# Patient Record
Sex: FEMALE | URBAN - METROPOLITAN AREA
[De-identification: names, ages, dates, MRNs, and addresses within clinical notes are randomized per-mention and may not be internally consistent; named-entity substitution may affect disease eponyms.]

---

## 2019-03-17 ENCOUNTER — EMERGENCY (EMERGENCY)
Facility: HOSPITAL | Age: 49
LOS: 1 days | Discharge: LEFT BEFORE DISCHARGE | End: 2019-03-17
Attending: EMERGENCY MEDICINE
Payer: COMMERCIAL

## 2019-03-17 VITALS
TEMPERATURE: 98 F | WEIGHT: 160.06 LBS | HEART RATE: 97 BPM | OXYGEN SATURATION: 100 % | HEIGHT: 68 IN | SYSTOLIC BLOOD PRESSURE: 113 MMHG | RESPIRATION RATE: 18 BRPM | DIASTOLIC BLOOD PRESSURE: 74 MMHG

## 2019-03-17 VITALS
DIASTOLIC BLOOD PRESSURE: 73 MMHG | HEART RATE: 82 BPM | OXYGEN SATURATION: 98 % | TEMPERATURE: 98 F | RESPIRATION RATE: 18 BRPM | SYSTOLIC BLOOD PRESSURE: 106 MMHG

## 2019-03-17 LAB
ALBUMIN SERPL ELPH-MCNC: 4.4 G/DL — SIGNIFICANT CHANGE UP (ref 3.3–5.2)
ALP SERPL-CCNC: 187 U/L — HIGH (ref 40–120)
ALT FLD-CCNC: 120 U/L — HIGH
ANION GAP SERPL CALC-SCNC: 12 MMOL/L — SIGNIFICANT CHANGE UP (ref 5–17)
APPEARANCE UR: CLEAR — SIGNIFICANT CHANGE UP
AST SERPL-CCNC: 87 U/L — HIGH
BACTERIA # UR AUTO: ABNORMAL
BASOPHILS # BLD AUTO: 0 K/UL — SIGNIFICANT CHANGE UP (ref 0–0.2)
BASOPHILS NFR BLD AUTO: 0.3 % — SIGNIFICANT CHANGE UP (ref 0–2)
BILIRUB SERPL-MCNC: 0.4 MG/DL — SIGNIFICANT CHANGE UP (ref 0.4–2)
BILIRUB UR-MCNC: NEGATIVE — SIGNIFICANT CHANGE UP
BUN SERPL-MCNC: 11 MG/DL — SIGNIFICANT CHANGE UP (ref 8–20)
CALCIUM SERPL-MCNC: 9.6 MG/DL — SIGNIFICANT CHANGE UP (ref 8.6–10.2)
CHLORIDE SERPL-SCNC: 102 MMOL/L — SIGNIFICANT CHANGE UP (ref 98–107)
CO2 SERPL-SCNC: 22 MMOL/L — SIGNIFICANT CHANGE UP (ref 22–29)
COLOR SPEC: YELLOW — SIGNIFICANT CHANGE UP
CREAT SERPL-MCNC: 0.65 MG/DL — SIGNIFICANT CHANGE UP (ref 0.5–1.3)
DIFF PNL FLD: ABNORMAL
EOSINOPHIL # BLD AUTO: 0.1 K/UL — SIGNIFICANT CHANGE UP (ref 0–0.5)
EOSINOPHIL NFR BLD AUTO: 2 % — SIGNIFICANT CHANGE UP (ref 0–6)
EPI CELLS # UR: SIGNIFICANT CHANGE UP
GLUCOSE SERPL-MCNC: 97 MG/DL — SIGNIFICANT CHANGE UP (ref 70–115)
GLUCOSE UR QL: NEGATIVE MG/DL — SIGNIFICANT CHANGE UP
HCG SERPL-ACNC: <4 MIU/ML — SIGNIFICANT CHANGE UP
HCT VFR BLD CALC: 38.3 % — SIGNIFICANT CHANGE UP (ref 37–47)
HGB BLD-MCNC: 12.1 G/DL — SIGNIFICANT CHANGE UP (ref 12–16)
KETONES UR-MCNC: NEGATIVE — SIGNIFICANT CHANGE UP
LEUKOCYTE ESTERASE UR-ACNC: ABNORMAL
LYMPHOCYTES # BLD AUTO: 1.2 K/UL — SIGNIFICANT CHANGE UP (ref 1–4.8)
LYMPHOCYTES # BLD AUTO: 20.2 % — SIGNIFICANT CHANGE UP (ref 20–55)
MCHC RBC-ENTMCNC: 26 PG — LOW (ref 27–31)
MCHC RBC-ENTMCNC: 31.6 G/DL — LOW (ref 32–36)
MCV RBC AUTO: 82.4 FL — SIGNIFICANT CHANGE UP (ref 81–99)
MONOCYTES # BLD AUTO: 0.5 K/UL — SIGNIFICANT CHANGE UP (ref 0–0.8)
MONOCYTES NFR BLD AUTO: 7.7 % — SIGNIFICANT CHANGE UP (ref 3–10)
NEUTROPHILS # BLD AUTO: 4.2 K/UL — SIGNIFICANT CHANGE UP (ref 1.8–8)
NEUTROPHILS NFR BLD AUTO: 69.8 % — SIGNIFICANT CHANGE UP (ref 37–73)
NITRITE UR-MCNC: NEGATIVE — SIGNIFICANT CHANGE UP
PH UR: 6.5 — SIGNIFICANT CHANGE UP (ref 5–8)
PLATELET # BLD AUTO: 242 K/UL — SIGNIFICANT CHANGE UP (ref 150–400)
POTASSIUM SERPL-MCNC: 5.8 MMOL/L — HIGH (ref 3.5–5.3)
POTASSIUM SERPL-SCNC: 5.8 MMOL/L — HIGH (ref 3.5–5.3)
PROT SERPL-MCNC: 7.6 G/DL — SIGNIFICANT CHANGE UP (ref 6.6–8.7)
PROT UR-MCNC: NEGATIVE MG/DL — SIGNIFICANT CHANGE UP
RBC # BLD: 4.65 M/UL — SIGNIFICANT CHANGE UP (ref 4.4–5.2)
RBC # FLD: 14.4 % — SIGNIFICANT CHANGE UP (ref 11–15.6)
RBC CASTS # UR COMP ASSIST: ABNORMAL /HPF (ref 0–4)
SODIUM SERPL-SCNC: 136 MMOL/L — SIGNIFICANT CHANGE UP (ref 135–145)
SP GR SPEC: 1.01 — SIGNIFICANT CHANGE UP (ref 1.01–1.02)
UROBILINOGEN FLD QL: NEGATIVE MG/DL — SIGNIFICANT CHANGE UP
WBC # BLD: 6.1 K/UL — SIGNIFICANT CHANGE UP (ref 4.8–10.8)
WBC # FLD AUTO: 6.1 K/UL — SIGNIFICANT CHANGE UP (ref 4.8–10.8)
WBC UR QL: SIGNIFICANT CHANGE UP

## 2019-03-17 PROCEDURE — 93005 ELECTROCARDIOGRAM TRACING: CPT

## 2019-03-17 PROCEDURE — 99284 EMERGENCY DEPT VISIT MOD MDM: CPT | Mod: 25

## 2019-03-17 PROCEDURE — 96374 THER/PROPH/DIAG INJ IV PUSH: CPT

## 2019-03-17 PROCEDURE — 36415 COLL VENOUS BLD VENIPUNCTURE: CPT

## 2019-03-17 PROCEDURE — 84702 CHORIONIC GONADOTROPIN TEST: CPT

## 2019-03-17 PROCEDURE — 93010 ELECTROCARDIOGRAM REPORT: CPT

## 2019-03-17 PROCEDURE — 82962 GLUCOSE BLOOD TEST: CPT

## 2019-03-17 PROCEDURE — 85027 COMPLETE CBC AUTOMATED: CPT

## 2019-03-17 PROCEDURE — 70450 CT HEAD/BRAIN W/O DYE: CPT

## 2019-03-17 PROCEDURE — 99284 EMERGENCY DEPT VISIT MOD MDM: CPT

## 2019-03-17 PROCEDURE — 81001 URINALYSIS AUTO W/SCOPE: CPT

## 2019-03-17 PROCEDURE — 70450 CT HEAD/BRAIN W/O DYE: CPT | Mod: 26

## 2019-03-17 PROCEDURE — 80053 COMPREHEN METABOLIC PANEL: CPT

## 2019-03-17 RX ORDER — ALPRAZOLAM 0.25 MG
1 TABLET ORAL
Qty: 0 | Refills: 0 | COMMUNITY

## 2019-03-17 RX ORDER — LEVETIRACETAM 250 MG/1
500 TABLET, FILM COATED ORAL
Qty: 0 | Refills: 0 | Status: DISCONTINUED | OUTPATIENT
Start: 2019-03-17 | End: 2019-03-22

## 2019-03-17 RX ORDER — LEVETIRACETAM 250 MG/1
1000 TABLET, FILM COATED ORAL ONCE
Qty: 0 | Refills: 0 | Status: COMPLETED | OUTPATIENT
Start: 2019-03-17 | End: 2019-03-17

## 2019-03-17 RX ORDER — SODIUM CHLORIDE 9 MG/ML
1000 INJECTION INTRAMUSCULAR; INTRAVENOUS; SUBCUTANEOUS ONCE
Qty: 0 | Refills: 0 | Status: COMPLETED | OUTPATIENT
Start: 2019-03-17 | End: 2019-03-17

## 2019-03-17 RX ORDER — SODIUM CHLORIDE 9 MG/ML
3 INJECTION INTRAMUSCULAR; INTRAVENOUS; SUBCUTANEOUS ONCE
Qty: 0 | Refills: 0 | Status: COMPLETED | OUTPATIENT
Start: 2019-03-17 | End: 2019-03-17

## 2019-03-17 RX ADMIN — SODIUM CHLORIDE 1000 MILLILITER(S): 9 INJECTION INTRAMUSCULAR; INTRAVENOUS; SUBCUTANEOUS at 15:14

## 2019-03-17 RX ADMIN — LEVETIRACETAM 400 MILLIGRAM(S): 250 TABLET, FILM COATED ORAL at 15:00

## 2019-03-17 RX ADMIN — LEVETIRACETAM 1000 MILLIGRAM(S): 250 TABLET, FILM COATED ORAL at 15:15

## 2019-03-17 RX ADMIN — SODIUM CHLORIDE 3 MILLILITER(S): 9 INJECTION INTRAMUSCULAR; INTRAVENOUS; SUBCUTANEOUS at 15:00

## 2019-03-17 NOTE — ED PROVIDER NOTE - CLINICAL SUMMARY MEDICAL DECISION MAKING FREE TEXT BOX
49 yo F hx of seizure now post-ictal with witnessed seizure by EMS, received versed 5 mg IM, 49 yo F hx of seizure now post-ictal with witnessed seizure by EMS, received versed 5 mg IM, not responding to painful stimuli, priority head CT done. will check blood work, UA, neurology consult.

## 2019-03-17 NOTE — ED PROVIDER NOTE - PMH
Herniated lumbar intervertebral disc    Interstitial cystitis    Pulmonary nodules    Spinal stenosis    ST segment depression

## 2019-03-17 NOTE — ED PROVIDER NOTE - PHYSICAL EXAMINATION
VITAL SIGNS: I have reviewed nursing notes and confirm.  CONSTITUTIONAL: Well-developed; well-nourished; (+) non-verbal  SKIN: Skin exam is warm and dry, no acute rash.  HEAD: Normocephalic; atraumatic.  EYES: PERRL, EOM unable to test conjunctiva and sclera clear.  ENT: No nasal discharge; airway clear. Throat clear.  NECK: Supple; non tender.  No lymphadenopathy.  CARD: S1, S2 normal; no murmurs, gallops, or rubs. Regular rate and rhythm.  RESP: No wheezes,  no rales or rhonchi.   ABD: Normal bowel sounds; soft; non-distended; non-tender; no hepatosplenomegaly.  EXT: Normal ROM. No clubbing, cyanosis or edema.  NEURO: (+) eyes open, occasional blinking, look at the left side but able to move the head without difficulty, no gaze deviation, Does NOT withdrawl to painful stimuli, b/l feet pointed

## 2019-03-17 NOTE — ED PROVIDER NOTE - OBJECTIVE STATEMENT
47 yo F hx of seizure/ pseudoseizure on ativan (unsure of medication compliance) p/w seizure and post-ictal state. EMS report that patient was found unconscious, post-ictal in bed, looking to the left, not moving. Patient never returned to normal baseline. She is normaly OA x 3. While in the ambulance, EMS report clonic tonic seizure lasting 30 second, she received 5mg versed IM. Currently, patient non-verbal, occasionally blinks the eye, neck turned to left but no gaze deviation. FS 70. priority CT called.

## 2019-03-17 NOTE — ED ADULT NURSE NOTE - CAS ELECT INFOMATION PROVIDED
DC instructions/patient given d/c instructions and instructed to follow up with pmd and return if worse

## 2019-03-17 NOTE — ED ADULT TRIAGE NOTE - CHIEF COMPLAINT QUOTE
Pt alert, no response to any stimuli, pt non verbal. as per EMS pt was last seen normal last night, pt was seen this morning to have seizure by EMS 5,g midazolam. Dr. Godfrey muñiz to evaluate.

## 2019-03-17 NOTE — ED ADULT NURSE REASSESSMENT NOTE - NS ED NURSE REASSESS COMMENT FT1
patient rec'd at this time p[t alert amd cooperative. sl to right inner wrist intact and flsues well patient reqwuesting to go home

## 2019-03-17 NOTE — CONSULT NOTE ADULT - ASSESSMENT
Impression:  Seizure disorder/nonepileptiform seizures    Plan:  keppra 500 mg BID  EEG  MRI of brain with and without NANDO to assess for mass  Consider video EEG monitoring off keppra  seizure precautions

## 2019-03-17 NOTE — PHARMACOTHERAPY INTERVENTION NOTE - COMMENTS
Patient post-ictal; unconscious. Referenced pharmacy records to identify if she takes any medications daily. Verified alprazolam (2 mg bid) and oxycodone/APAP (1 tab q6h prn) were filled 03/03 for 30 day supply

## 2019-03-17 NOTE — ED ADULT NURSE NOTE - OBJECTIVE STATEMENT
Pt A&OX3, states she had a seizure.  Pt was found unresponsive on bed, in her room by family.  Pt appears to be lethargic.  Pt denies any head injury.  Pt states she has been told she has pseudoseizures.

## 2019-03-17 NOTE — CONSULT NOTE ADULT - SUBJECTIVE AND OBJECTIVE BOX
HPI:  Patient is a 48 year old female who presents for seizures.  She has history of seziures/psuedo seizures.   She is on no know anti seziure medication.  She reprots family found her after a seizure and she had another seizure at ER lasting 30 seconds which was GTC.     PAST MEDICAL & SURGICAL HISTORY:  ST segment depression  Herniated lumbar intervertebral disc  Spinal stenosis  Interstitial cystitis  Pulmonary nodules  Delivered by  section: X 3      MEDICATIONS  (STANDING):    MEDICATIONS  (PRN):      Allergies    penicillin (Rash)    Intolerances        SOCIAL HISTORY:    FAMILY HISTORY:  Family history of seizure in son (Child): Autistic child  Family history of autism (Child)      Vital Signs Last 24 Hrs  T(C): 36.5 (17 Mar 2019 12:50), Max: 36.5 (17 Mar 2019 12:50)  T(F): 97.7 (17 Mar 2019 12:50), Max: 97.7 (17 Mar 2019 12:50)  HR: 97 (17 Mar 2019 12:50) (97 - 97)  BP: 113/74 (17 Mar 2019 12:50) (113/74 - 113/74)  BP(mean): --  RR: 18 (17 Mar 2019 12:50) (18 - 18)  SpO2: 100% (17 Mar 2019 12:50) (100% - 100%)    Neurological Exam:  Patient is minimally drowsy, but oriented .  There is no aphasia, there is mild  dysarthria . Follows complex commands. Vision is intact to confrontation.   Pupils are equal and reactive. Extra occular  muscles are intact. There is no facial droop or asymmetry. Tongue is midline.   Sensation is intact in the face. Other CN II-XII are intact.   On motor examination all muscles are 5/5 and there is no pronator drift. Sensory is intact to pinprick and touch. DTR are 2/4 all 4 extremities. Babinski is negative bilateral.     LABS:                        12.1   6.1   )-----------( 242      ( 17 Mar 2019 15:15 )             38.3     03-    136  |  102  |  11.0  ----------------------------<  97  5.8<H>   |  22.0  |  0.65    Ca    9.6      17 Mar 2019 15:15    TPro  7.6  /  Alb  4.4  /  TBili  0.4  /  DBili  x   /  AST  87<H>  /  ALT  120<H>  /  AlkPhos  187<H>            RADIOLOGY & ADDITIONAL STUDIES:   EXAM:  CT BRAIN                          PROCEDURE DATE:  2019          INTERPRETATION:  Comparison exam dated 2015    Clinical information: Seizure    Axial images obtained, coronal and sagittal images computer reformatted.    The ventricles, basal cisterns, and subarachnoid sulci are normal in   appearance for patient of this age group. No evidence of mass effect,   midline shift, epidural, or subdural collection. No sign of acute or   recent hemorrhage. No unusual calcificationspresent. Calvarium intact.   Mastoids pneumatized. Diffuse mucosal thickening left maxillary sinus.   Mucosal thickening right ethmoid sinus.    IMPRESSION:    No acute intracranial pathology. Follow-up with MRI if clinically   appropriate.

## 2019-03-17 NOTE — ED PROVIDER NOTE - PROGRESS NOTE DETAILS
patient seen by neurology Patient now AO x 3 but slow speech. patient seen by neurology, recommend EEG and MRI. Patient report that she had multiple EEG done and didn't show epileptic seizure and that seizure medication make her feel worse. Patient want to leave against medical advise and follow up with her neurologist. Pt understands and recalls risks of leaving against medical advice, including death. Pt states that pt will see her neurologist. Pt advised to return to the ED if pt feels worse. She refused prescription for Keppra.

## 2019-03-17 NOTE — ED ADULT NURSE NOTE - NSIMPLEMENTINTERV_GEN_ALL_ED
Implemented All Universal Safety Interventions:  [place] to call system. Call bell, personal items and telephone within reach. Instruct patient to call for assistance. Room bathroom lighting operational. Non-slip footwear when patient is off stretcher. Physically safe environment: no spills, clutter or unnecessary equipment. Stretcher in lowest position, wheels locked, appropriate side rails in place.

## 2021-06-01 ENCOUNTER — OUTPATIENT (OUTPATIENT)
Dept: OUTPATIENT SERVICES | Facility: HOSPITAL | Age: 51
LOS: 1 days | End: 2021-06-01
Payer: MEDICAID

## 2021-06-30 DIAGNOSIS — Z71.89 OTHER SPECIFIED COUNSELING: ICD-10-CM

## 2021-12-01 PROCEDURE — G9005: CPT

## 2022-03-21 ENCOUNTER — APPOINTMENT (OUTPATIENT)
Dept: NEUROLOGY | Facility: CLINIC | Age: 52
End: 2022-03-21

## 2022-04-18 ENCOUNTER — APPOINTMENT (OUTPATIENT)
Dept: PULMONOLOGY | Facility: CLINIC | Age: 52
End: 2022-04-18

## 2022-12-06 ENCOUNTER — APPOINTMENT (OUTPATIENT)
Dept: RHEUMATOLOGY | Facility: CLINIC | Age: 52
End: 2022-12-06

## 2023-11-13 ENCOUNTER — APPOINTMENT (OUTPATIENT)
Dept: NEUROLOGY | Facility: CLINIC | Age: 53
End: 2023-11-13